# Patient Record
Sex: MALE | Race: WHITE | URBAN - METROPOLITAN AREA
[De-identification: names, ages, dates, MRNs, and addresses within clinical notes are randomized per-mention and may not be internally consistent; named-entity substitution may affect disease eponyms.]

---

## 2020-05-01 ENCOUNTER — APPOINTMENT (RX ONLY)
Dept: URBAN - METROPOLITAN AREA CLINIC 322 | Facility: CLINIC | Age: 40
Setting detail: DERMATOLOGY
End: 2020-05-01

## 2020-05-01 DIAGNOSIS — L64.8 OTHER ANDROGENIC ALOPECIA: ICD-10-CM

## 2020-05-01 PROCEDURE — ? PLATELET RICH PLASMA INJECTION

## 2020-05-01 ASSESSMENT — LOCATION ZONE DERM: LOCATION ZONE: SCALP

## 2020-05-01 ASSESSMENT — LOCATION SIMPLE DESCRIPTION DERM: LOCATION SIMPLE: POSTERIOR SCALP

## 2020-05-01 ASSESSMENT — LOCATION DETAILED DESCRIPTION DERM: LOCATION DETAILED: POSTERIOR MID-PARIETAL SCALP

## 2020-05-01 NOTE — PROCEDURE: PLATELET RICH PLASMA INJECTION
Depth In Mm (Will Not Render If 0): 0
Treatment Number (Optional): 1
Standard Default Technique For Making Prp: Pt was advised on adequate hydration and eating 24 hours prior to procedure.  Pt was brought into room and in sterile fashion blood was drawn into 22ml eclipse tube with 24g butterfly needle.  Tube was mixed adequately with inversion 5x and was centrifuged at 3300rpm for a total 10 min.  PRP was then drawn up in sterile fashion into 3cc syringes.  Scalp was cleansed with hibiclens and alcohol preparatory wipes.  Injections were made every 1-2cm with 0.1-0.3cc aliquotes intradermally as well as subcutaneous (3-6mm depth) with 30g 1/2in needle.  A total of 12-15cc injected in scalp.  Hemostasis was obtained at all areas.  Scalp was cleansed with wound wash saline.  Pt advised on Ibuprofen for pain and may continue with normal hair regimen.  Call with any complications experienced.
Detail Level: Zone
Post-Care Instructions: After the procedure, take precautions agains sun exposure.  Advised would continue use of minoxidil within 48 hours of treatment.
Show Additional Techniques: Yes
Consent: Written consent obtained, risks reviewed including but not limited to pain, scarring, infection, bruising, HA, vascular events and incomplete improvement.  Patient understands the procedure is cosmetic in nature and will require out of pocket payment.
Which Technique?: Default
Price (Use Numbers Only, No Special Characters Or $): 200
Location #1: frontal scalp

## 2020-05-01 NOTE — HPI: ANDROGENIC ALOPECIA
Is This A New Presentation, Or A Follow-Up?: Androgenic Alopecia
How Did The Hair Loss Occur?: gradual in onset
How Severe Is Your Hair Loss?: mild

## 2020-11-20 ENCOUNTER — APPOINTMENT (RX ONLY)
Dept: URBAN - METROPOLITAN AREA CLINIC 322 | Facility: CLINIC | Age: 40
Setting detail: DERMATOLOGY
End: 2020-11-20

## 2020-11-20 DIAGNOSIS — L64.8 OTHER ANDROGENIC ALOPECIA: ICD-10-CM

## 2020-11-20 PROCEDURE — ? ADDITIONAL NOTES

## 2020-11-20 PROCEDURE — ? PLATELET RICH PLASMA INJECTION

## 2020-11-20 ASSESSMENT — LOCATION DETAILED DESCRIPTION DERM: LOCATION DETAILED: POSTERIOR MID-PARIETAL SCALP

## 2020-11-20 ASSESSMENT — LOCATION SIMPLE DESCRIPTION DERM: LOCATION SIMPLE: POSTERIOR SCALP

## 2020-11-20 ASSESSMENT — LOCATION ZONE DERM: LOCATION ZONE: SCALP

## 2020-11-20 NOTE — PROCEDURE: PLATELET RICH PLASMA INJECTION
Depth In Mm (Will Not Render If 0): 0
Treatment Number (Optional): 2
Standard Default Technique For Making Prp: Pt was advised on adequate hydration and eating 24 hours prior to procedure.  Pt was brought into room and in sterile fashion blood was drawn into 22ml eclipse tube with 24g butterfly needle.  Tube was mixed adequately with inversion 5x and was centrifuged at 3300rpm for a total 10 min.  PRP was then drawn up in sterile fashion into 3cc syringes.  Scalp was cleansed with hibiclens and alcohol preparatory wipes.  Injections were made every 1-2cm with 0.1-0.3cc aliquotes intradermally as well as subcutaneous (3-6mm depth) with 30g 1/2in needle.  A total of 12-15cc injected in scalp.  Hemostasis was obtained at all areas.  Scalp was cleansed with wound wash saline.  Pt advised on Ibuprofen for pain and may continue with normal hair regimen.  Call with any complications experienced.
Detail Level: Zone
Post-Care Instructions: After the procedure, take precautions agains sun exposure.  Advised would continue use of minoxidil within 48 hours of treatment.\\n\\nPt is treating hairline only; micro kit was used
Show Additional Techniques: Yes
Consent: Written consent obtained, risks reviewed including but not limited to pain, scarring, infection, bruising, HA, vascular events and incomplete improvement.  Patient understands the procedure is cosmetic in nature and will require out of pocket payment.
Which Technique?: Default
Price (Use Numbers Only, No Special Characters Or $): 200
Location #1: Hair line Only

## 2021-04-23 ENCOUNTER — APPOINTMENT (RX ONLY)
Dept: URBAN - METROPOLITAN AREA CLINIC 322 | Facility: CLINIC | Age: 41
Setting detail: DERMATOLOGY
End: 2021-04-23

## 2021-04-23 DIAGNOSIS — L64.8 OTHER ANDROGENIC ALOPECIA: ICD-10-CM

## 2021-04-23 PROCEDURE — ? PLATELET RICH PLASMA INJECTION

## 2021-04-23 PROCEDURE — ? ADDITIONAL NOTES

## 2021-04-23 ASSESSMENT — LOCATION ZONE DERM: LOCATION ZONE: SCALP

## 2021-04-23 ASSESSMENT — LOCATION SIMPLE DESCRIPTION DERM: LOCATION SIMPLE: POSTERIOR SCALP

## 2021-04-23 ASSESSMENT — LOCATION DETAILED DESCRIPTION DERM: LOCATION DETAILED: POSTERIOR MID-PARIETAL SCALP

## 2021-04-23 NOTE — PROCEDURE: PLATELET RICH PLASMA INJECTION
Depth In Mm (Will Not Render If 0): 0
Treatment Number (Optional): 3
Standard Default Technique For Making Prp: Pt was advised on adequate hydration and eating 24 hours prior to procedure.  Pt was brought into room and in sterile fashion blood was drawn into 22ml eclipse tube with 24g butterfly needle.  Tube was mixed adequately with inversion 5x and was centrifuged at 3300rpm for a total 10 min.  PRP was then drawn up in sterile fashion into 3cc syringes.  Scalp was cleansed with hibiclens and alcohol preparatory wipes.  Injections were made every 1-2cm with 0.1-0.3cc aliquotes intradermally as well as subcutaneous (3-6mm depth) with 30g 1/2in needle.  A total of 12-15cc injected in scalp.  Hemostasis was obtained at all areas.  Scalp was cleansed with wound wash saline.  Pt advised on Ibuprofen for pain and may continue with normal hair regimen.  Call with any complications experienced.
Detail Level: Zone
Post-Care Instructions: After the procedure, take precautions agains sun exposure.  Advised would continue use of minoxidil within 48 hours of treatment.\\n\\nPt is treating hairline only; micro kit was used
Show Additional Techniques: Yes
Consent: Written consent obtained, risks reviewed including but not limited to pain, scarring, infection, bruising, HA, vascular events and incomplete improvement.  Patient understands the procedure is cosmetic in nature and will require out of pocket payment.
Which Technique?: Default
Price (Use Numbers Only, No Special Characters Or $): 200
Location #1: Hair line Only

## 2022-04-28 ENCOUNTER — APPOINTMENT (RX ONLY)
Dept: URBAN - METROPOLITAN AREA CLINIC 322 | Facility: CLINIC | Age: 42
Setting detail: DERMATOLOGY
End: 2022-04-28

## 2022-04-28 DIAGNOSIS — L64.8 OTHER ANDROGENIC ALOPECIA: ICD-10-CM

## 2022-04-28 PROCEDURE — ? PLATELET RICH PLASMA INJECTION

## 2022-04-28 PROCEDURE — ? ADDITIONAL NOTES

## 2022-04-28 ASSESSMENT — LOCATION SIMPLE DESCRIPTION DERM: LOCATION SIMPLE: POSTERIOR SCALP

## 2022-04-28 ASSESSMENT — LOCATION DETAILED DESCRIPTION DERM: LOCATION DETAILED: POSTERIOR MID-PARIETAL SCALP

## 2022-04-28 ASSESSMENT — LOCATION ZONE DERM: LOCATION ZONE: SCALP

## 2022-04-28 NOTE — PROCEDURE: PLATELET RICH PLASMA INJECTION
Depth In Mm (Will Not Render If 0): 0
Treatment Number (Optional): 4
Standard Default Technique For Making Prp: Pt was advised on adequate hydration and eating 24 hours prior to procedure.  Pt was brought into room and in sterile fashion blood was drawn into 22ml eclipse tube with 24g butterfly needle.  Tube was mixed adequately with inversion 5x and was centrifuged at 3300rpm for a total 10 min.  PRP was then drawn up in sterile fashion into 3cc syringes.  Scalp was cleansed with hibiclens and alcohol preparatory wipes.  Injections were made every 1-2cm with 0.1-0.3cc aliquotes intradermally as well as subcutaneous (3-6mm depth) with 30g 1/2in needle.  A total of 12-15cc injected in scalp.  Hemostasis was obtained at all areas.  Scalp was cleansed with wound wash saline.  Pt advised on Ibuprofen for pain and may continue with normal hair regimen.  Call with any complications experienced.
Detail Level: Zone
Post-Care Instructions: After the procedure, take precautions agains sun exposure.  Advised would continue use of minoxidil within 48 hours of treatment.\\n\\nPt is treating hairline only; micro kit was used
Show Additional Techniques: Yes
Consent: Written consent obtained, risks reviewed including but not limited to pain, scarring, infection, bruising, HA, vascular events and incomplete improvement.  Patient understands the procedure is cosmetic in nature and will require out of pocket payment.
Which Technique?: Default
Price (Use Numbers Only, No Special Characters Or $): 200
Location #1: Hair line Only
Venipuncture Paragraph: An alcohol pad was applied to the venipuncture site. Venipuncture was performed using a butterfly needle. Pressure and a bandaid was applied to the site. No complications were noted.

## 2022-08-19 ENCOUNTER — APPOINTMENT (RX ONLY)
Dept: URBAN - METROPOLITAN AREA CLINIC 322 | Facility: CLINIC | Age: 42
Setting detail: DERMATOLOGY
End: 2022-08-19

## 2022-08-19 DIAGNOSIS — L64.8 OTHER ANDROGENIC ALOPECIA: ICD-10-CM

## 2022-08-19 PROCEDURE — ? ADDITIONAL NOTES

## 2022-08-19 PROCEDURE — ? PLATELET RICH PLASMA INJECTION

## 2022-08-19 ASSESSMENT — LOCATION DETAILED DESCRIPTION DERM: LOCATION DETAILED: POSTERIOR MID-PARIETAL SCALP

## 2022-08-19 ASSESSMENT — LOCATION SIMPLE DESCRIPTION DERM: LOCATION SIMPLE: POSTERIOR SCALP

## 2022-08-19 ASSESSMENT — LOCATION ZONE DERM: LOCATION ZONE: SCALP

## 2022-08-19 NOTE — PROCEDURE: PLATELET RICH PLASMA INJECTION
Depth In Mm (Will Not Render If 0): 0
Treatment Number (Optional): 5
Standard Default Technique For Making Prp: Pt was advised on adequate hydration and eating 24 hours prior to procedure.  Pt was brought into room and in sterile fashion blood was drawn into 22ml eclipse tube with 24g butterfly needle.  Tube was mixed adequately with inversion 5x and was centrifuged at 3300rpm for a total 10 min.  PRP was then drawn up in sterile fashion into 3cc syringes.  Scalp was cleansed with hibiclens and alcohol preparatory wipes.  Injections were made every 1-2cm with 0.1-0.3cc aliquotes intradermally as well as subcutaneous (3-6mm depth) with 30g 1/2in needle.  A total of 12-15cc injected in scalp.  Hemostasis was obtained at all areas.  Scalp was cleansed with wound wash saline.  Pt advised on Ibuprofen for pain and may continue with normal hair regimen.  Call with any complications experienced.
Detail Level: Zone
Post-Care Instructions: After the procedure, take precautions agains sun exposure.  Advised would continue use of minoxidil within 48 hours of treatment.\\n\\nPt is treating hairline only; micro kit was used
Show Additional Techniques: Yes
Consent: Written consent obtained, risks reviewed including but not limited to pain, scarring, infection, bruising, HA, vascular events and incomplete improvement.  Patient understands the procedure is cosmetic in nature and will require out of pocket payment.
Which Technique?: Default
Price (Use Numbers Only, No Special Characters Or $): 200
Location #1: Hair line Only
Venipuncture Paragraph: An alcohol pad was applied to the venipuncture site. Venipuncture was performed using a butterfly needle. Pressure and a bandaid was applied to the site. No complications were noted.

## 2023-01-20 ENCOUNTER — APPOINTMENT (RX ONLY)
Dept: URBAN - METROPOLITAN AREA CLINIC 322 | Facility: CLINIC | Age: 43
Setting detail: DERMATOLOGY
End: 2023-01-20

## 2023-01-20 DIAGNOSIS — L64.8 OTHER ANDROGENIC ALOPECIA: ICD-10-CM

## 2023-01-20 PROCEDURE — ? ADDITIONAL NOTES

## 2023-01-20 PROCEDURE — ? PLATELET RICH PLASMA INJECTION

## 2023-01-20 ASSESSMENT — LOCATION ZONE DERM: LOCATION ZONE: SCALP

## 2023-01-20 ASSESSMENT — LOCATION SIMPLE DESCRIPTION DERM: LOCATION SIMPLE: POSTERIOR SCALP

## 2023-01-20 ASSESSMENT — LOCATION DETAILED DESCRIPTION DERM: LOCATION DETAILED: POSTERIOR MID-PARIETAL SCALP

## 2024-02-12 NOTE — PROCEDURE: PLATELET RICH PLASMA INJECTION
Depth In Mm (Will Not Render If 0): 0
Treatment Number (Optional): 6
Standard Default Technique For Making Prp: Pt was advised on adequate hydration and eating 24 hours prior to procedure.  Pt was brought into room and in sterile fashion blood was drawn into 22ml eclipse tube with 24g butterfly needle.  Tube was mixed adequately with inversion 5x and was centrifuged at 3300rpm for a total 10 min.  PRP was then drawn up in sterile fashion into 3cc syringes.  Scalp was cleansed with hibiclens and alcohol preparatory wipes.  Injections were made every 1-2cm with 0.1-0.3cc aliquotes intradermally as well as subcutaneous (3-6mm depth) with 30g 1/2in needle.  A total of 12-15cc injected in scalp.  Hemostasis was obtained at all areas.  Scalp was cleansed with wound wash saline.  Pt advised on Ibuprofen for pain and may continue with normal hair regimen.  Call with any complications experienced.
Detail Level: Zone
Post-Care Instructions: After the procedure, take precautions agains sun exposure.  Advised would continue use of minoxidil within 48 hours of treatment.\\n\\nPt is treating hairline only; micro kit was used
Show Additional Techniques: Yes
Consent: Written consent obtained, risks reviewed including but not limited to pain, scarring, infection, bruising, HA, vascular events and incomplete improvement.  Patient understands the procedure is cosmetic in nature and will require out of pocket payment.
Which Technique?: Default
[email or web address]
Price (Use Numbers Only, No Special Characters Or $): 200
Location #1: Hair line Only
Venipuncture Paragraph: An alcohol pad was applied to the venipuncture site. Venipuncture was performed using a butterfly needle. Pressure and a bandaid was applied to the site. No complications were noted.

## 2025-07-28 ENCOUNTER — APPOINTMENT (OUTPATIENT)
Dept: URBAN - METROPOLITAN AREA CLINIC 322 | Facility: CLINIC | Age: 45
Setting detail: DERMATOLOGY
End: 2025-07-28

## 2025-07-28 DIAGNOSIS — L82.1 OTHER SEBORRHEIC KERATOSIS: ICD-10-CM | Status: INADEQUATELY CONTROLLED

## 2025-07-28 PROCEDURE — ? COUNSELING

## 2025-07-28 ASSESSMENT — LOCATION DETAILED DESCRIPTION DERM: LOCATION DETAILED: RIGHT SUPERIOR FOREHEAD

## 2025-07-28 ASSESSMENT — LOCATION SIMPLE DESCRIPTION DERM: LOCATION SIMPLE: RIGHT FOREHEAD

## 2025-07-28 ASSESSMENT — LOCATION ZONE DERM: LOCATION ZONE: FACE

## 2025-07-28 NOTE — PROCEDURE: COUNSELING
Patient Specific Counseling (Will Not Stick From Patient To Patient): After discussing risks of Ln2, provided complimentary Ln2 to lesion on right frontal scalp
Detail Level: Detailed